# Patient Record
Sex: MALE | NOT HISPANIC OR LATINO | Employment: STUDENT | URBAN - METROPOLITAN AREA
[De-identification: names, ages, dates, MRNs, and addresses within clinical notes are randomized per-mention and may not be internally consistent; named-entity substitution may affect disease eponyms.]

---

## 2024-08-20 ENCOUNTER — NURSE TRIAGE (OUTPATIENT)
Dept: NURSING | Facility: CLINIC | Age: 24
End: 2024-08-20

## 2024-08-21 NOTE — TELEPHONE ENCOUNTER
"Nurse Triage SBAR    Is this a 2nd Level Triage? NO    Situation: pt calling with concerns for splinter in his hand     Background: this occurred shortly before calling.    Assessment: Pt states he has a little bigger than an inch long splinter superficially in his hand. He has not \"tried too hard\" to get the splinter out. He does not believe he is up to date on his vaccinations. It is only slightly painful.    Protocol Recommended Disposition:   Home Care, See PCP Within 24 Hours    Recommendation: Removal of splinter discussed as well as getting a TDAP. Go to  tomorrow.    Reason for Disposition   [1] Caller can't get FB out AND [2] causing no pain  (Exception: Tiny, superficial, pain-free FBs; since these don't need to be removed.)    Additional Information   Negative: Sounds like a life-threatening emergency to the triager   Negative: SEVERE pain (e.g., excruciating)   Negative: Deeply embedded FB (e.g., needle or toothpick in foot)   Negative: FB has a leonard (e.g., fish hook)   Negative: Sounds like a serious injury to the triager   Negative: [1] Caller can't get FB out AND [2] it's causing pain   Negative: FB is clear (glass or plastic)   Negative: FB is a BB   Negative: [1] Wound looks infected (e.g., redness, red streaks, pus, swollen) AND [2] fever   Negative: [1] Wound looks infected AND [2] large red area (> 2 in. or 5 cm)   Negative: [1] Dirt in the wound AND [2] not removed with 15 minutes of scrubbing   Negative: [1] Wound looks infected (e.g., redness, red streaks, pus, swollen) AND [2] no fever   Negative: [1] Caller reluctant to take out FB AND [2] it's causing pain    Protocols used: Skin Foreign Body-A-LISETH Wallis RN  [facility] Nurse Advisor   8/20/2024  10:37 PM  "

## 2024-11-20 VITALS
HEART RATE: 68 BPM | OXYGEN SATURATION: 100 % | DIASTOLIC BLOOD PRESSURE: 87 MMHG | RESPIRATION RATE: 16 BRPM | SYSTOLIC BLOOD PRESSURE: 142 MMHG | TEMPERATURE: 97.6 F

## 2024-11-20 PROCEDURE — 99284 EMERGENCY DEPT VISIT MOD MDM: CPT | Mod: 25 | Performed by: EMERGENCY MEDICINE

## 2024-11-20 PROCEDURE — 12011 RPR F/E/E/N/L/M 2.5 CM/<: CPT | Performed by: EMERGENCY MEDICINE

## 2024-11-20 PROCEDURE — 99283 EMERGENCY DEPT VISIT LOW MDM: CPT | Performed by: EMERGENCY MEDICINE

## 2024-11-20 ASSESSMENT — COLUMBIA-SUICIDE SEVERITY RATING SCALE - C-SSRS
2. HAVE YOU ACTUALLY HAD ANY THOUGHTS OF KILLING YOURSELF IN THE PAST MONTH?: NO
6. HAVE YOU EVER DONE ANYTHING, STARTED TO DO ANYTHING, OR PREPARED TO DO ANYTHING TO END YOUR LIFE?: NO
1. IN THE PAST MONTH, HAVE YOU WISHED YOU WERE DEAD OR WISHED YOU COULD GO TO SLEEP AND NOT WAKE UP?: NO

## 2024-11-21 ENCOUNTER — APPOINTMENT (OUTPATIENT)
Dept: CT IMAGING | Facility: CLINIC | Age: 24
End: 2024-11-21
Attending: EMERGENCY MEDICINE
Payer: COMMERCIAL

## 2024-11-21 ENCOUNTER — HOSPITAL ENCOUNTER (EMERGENCY)
Facility: CLINIC | Age: 24
Discharge: HOME | End: 2024-11-21
Attending: EMERGENCY MEDICINE | Admitting: EMERGENCY MEDICINE
Payer: COMMERCIAL

## 2024-11-21 DIAGNOSIS — S09.90XA CLOSED HEAD INJURY, INITIAL ENCOUNTER: ICD-10-CM

## 2024-11-21 DIAGNOSIS — S01.312A LACERATION OF ANTIHELIX OF LEFT EAR, INITIAL ENCOUNTER: ICD-10-CM
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
## 2024-11-21 PROCEDURE — 70450 CT HEAD/BRAIN W/O DYE: CPT | Mod: 26 | Performed by: RADIOLOGY

## 2024-11-21 PROCEDURE — 70450 CT HEAD/BRAIN W/O DYE: CPT

## 2024-11-21 ASSESSMENT — ACTIVITIES OF DAILY LIVING (ADL): ADLS_ACUITY_SCORE: 0

## 2024-11-21 NOTE — ED PROVIDER NOTES
"    History     Chief Complaint   Patient presents with    Fall     Pt fell on stairs and hit left ear and side/leg, no LOC, Pain 6/10 at this time     HPI  Zuleyka Fontaine is an otherwise healthy 24 year old male who presents to the ED for evaluation following a fall.  Fall occurred approximately 15 minutes prior to arrival.  He was coming out of a building on campus, slipped on some ice and fell onto his left side.  He states he hit the left side of his head \"really hard\".  He is concerned about concussion.  He works as an ER scribe.  He wants to rule out other pathology such as skull fracture/hemorrhage.  He has a laceration to his ear as well as an abrasion to his chin.  He has some tenderness over the belly of his left quadriceps muscle.  He is able to ambulate.  Denies loss of consciousness.  No preceding syncope, lightheadedness, or other symptoms before the fall.  He does not feel confused.  Denies any vision problems.  No neck or back pain, chest pain, abdomen pain, or any other symptoms.    Past Medical History  No past medical history on file.  No past surgical history on file.  No current outpatient medications on file.    No Known Allergies  Family History  No family history on file.  Social History          A medically appropriate review of systems was performed with pertinent positives and negatives noted in the HPI, and all other systems negative.     Physical Exam   BP: (!) 142/87  Pulse: 68  Temp: 97.6  F (36.4  C)  Resp: 16  SpO2: 100 %      Physical Exam  Vitals and nursing note reviewed.   Constitutional:       General: He is not in acute distress.     Appearance: Normal appearance.   HENT:      Head: Normocephalic.      Ears:        Comments: 1 cm laceration at the border of the antihelix/antitragus.  Does not involve the helix.  Is not through and through.  Approximately 0.5 to 1 mm in depth.  Mild venous oozing.  Well-approximated/linear.  No foreign bodies.     Nose: Nose normal.   Eyes:      " Pupils: Pupils are equal, round, and reactive to light.   Cardiovascular:      Rate and Rhythm: Normal rate and regular rhythm.   Pulmonary:      Effort: Pulmonary effort is normal.   Abdominal:      General: There is no distension.   Musculoskeletal:         General: No deformity. Normal range of motion.      Cervical back: Normal range of motion.   Skin:     General: Skin is warm.   Neurological:      Mental Status: He is alert and oriented to person, place, and time.   Psychiatric:         Mood and Affect: Mood normal.         ED Results and Procedures     Results for orders placed or performed during the hospital encounter of 11/21/24 (from the past 24 hours)   CT Head w/o Contrast    Narrative    EXAM: CT HEAD W/O CONTRAST  LOCATION: [facility]  DATE: 11/21/2024    INDICATION: fall on L side of head  COMPARISON: None.  TECHNIQUE: Routine CT Head without IV contrast. Multiplanar reformats. Dose reduction techniques were used.    FINDINGS:  INTRACRANIAL CONTENTS: No intracranial hemorrhage, extraaxial collection, or mass effect.  No CT evidence of acute infarct. Normal parenchymal attenuation. Normal ventricles and sulci.     VISUALIZED ORBITS/SINUSES/MASTOIDS: No intraorbital abnormality. Scattered polyps or retention cysts in the paranasal sinuses No middle ear or mastoid effusion.    BONES/SOFT TISSUES: No acute abnormality.      Impression    IMPRESSION:  1.  No acute intracranial process.       Medications - No data to display         [facility]    -Laceration Repair    Date/Time: 11/21/2024 1:08 AM    Performed by: David Montes DO  Authorized by: David Montes DO    Risks, benefits and alternatives discussed.      ANESTHESIA (see MAR for exact dosages):     Anesthesia method:  None  LACERATION DETAILS     Location:  Ear    Ear location:  L ear    Length (cm):  1    Depth (mm):  1    REPAIR TYPE:      Repair type:  Simple    EXPLORATION:     Hemostasis achieved with:  Direct pressure    Wound exploration: wound explored through full range of motion and entire depth of wound probed and visualized      Wound extent: areolar tissue not violated, fascia not violated, no foreign body, no signs of injury, no nerve damage, no tendon damage, no underlying fracture and no vascular damage      Contaminated: no      TREATMENT:     Area cleansed with:  Soap and water and Betadine    Amount of cleaning:  Standard    Irrigation solution:  Sterile water    Irrigation method:  Syringe    Visualized foreign bodies/material removed: no      SKIN REPAIR     Repair method:  Tissue adhesive    APPROXIMATION     Approximation:  Close    POST-PROCEDURE DETAILS     Dressing:  Open (no dressing)      PROCEDURE    Patient Tolerance:  Patient tolerated the procedure well with no immediate complications        ED Course/Assessments & Plan (with Medical Decision Making)   ED for evaluation after a fall.  He hit the left side of his head.  Has a 1 cm laceration to the antihelix/antitragus that was repaired with tissue adhesive, see procedure note above.  He has a small abrasion to his chin, topical antibiotic ointment applied.  Tetanus is up-to-date.  Has some tenderness throughout her left quadriceps muscle consistent with contusion.  No bony tenderness suggesting fracture or dislocation.  CT head is negative for skull fracture, intracranial hemorrhage, or other acute pathology.  Patient states that he still feels a bit woozy and feels that he may have mild concussion.  Discussed concussion precautions and the need for repeat follow-up within the next week.  He will follow up in the [facility].  Educated reasons to return to the ED.      I have reviewed the nursing notes.    I have reviewed the findings, diagnosis, plan and need for follow up with the patient.    Critical care was not performed.     Medical Decision Making  The  patient's presentation was of moderate complexity (an acute complicated injury).    The patient's evaluation involved:  ordering and/or review of 1 test(s) in this encounter (see separate area of note for details)    The patient's management necessitated moderate risk (a decision regarding minor procedure (laceration repair) with risk factors of none).    There are no discharge medications for this patient.      Final diagnoses:   Laceration of antihelix of left ear, initial encounter   Fall, initial encounter   Closed head injury, initial encounter       DAVID HERNANDEZ DO    [facility] EMERGENCY DEPARTMENT      11/20/2024            David Hernandez DO  11/21/24 0145

## 2025-01-19 ENCOUNTER — HEALTH MAINTENANCE LETTER (OUTPATIENT)
Age: 25
End: 2025-01-19